# Patient Record
Sex: FEMALE | Race: WHITE | NOT HISPANIC OR LATINO | Employment: PART TIME | URBAN - METROPOLITAN AREA
[De-identification: names, ages, dates, MRNs, and addresses within clinical notes are randomized per-mention and may not be internally consistent; named-entity substitution may affect disease eponyms.]

---

## 2023-10-16 PROBLEM — J06.9 VIRAL UPPER RESPIRATORY TRACT INFECTION: Status: ACTIVE | Noted: 2023-10-16

## 2023-10-18 ENCOUNTER — OFFICE VISIT (OUTPATIENT)
Dept: ORTHOPEDIC SURGERY | Facility: HOSPITAL | Age: 58
End: 2023-10-18
Payer: COMMERCIAL

## 2023-10-18 VITALS — BODY MASS INDEX: 21.66 KG/M2 | HEIGHT: 67 IN | WEIGHT: 138 LBS

## 2023-10-18 DIAGNOSIS — M25.552 LEFT HIP PAIN: Primary | ICD-10-CM

## 2023-10-18 PROCEDURE — 2500000004 HC RX 250 GENERAL PHARMACY W/ HCPCS (ALT 636 FOR OP/ED): Performed by: PHYSICIAN ASSISTANT

## 2023-10-18 PROCEDURE — 1036F TOBACCO NON-USER: CPT | Performed by: PHYSICIAN ASSISTANT

## 2023-10-18 PROCEDURE — 20611 DRAIN/INJ JOINT/BURSA W/US: CPT | Mod: LT | Performed by: PHYSICIAN ASSISTANT

## 2023-10-18 PROCEDURE — 2500000005 HC RX 250 GENERAL PHARMACY W/O HCPCS: Performed by: PHYSICIAN ASSISTANT

## 2023-10-18 RX ORDER — ESTRADIOL 0.03 MG/D
1 PATCH TRANSDERMAL
COMMUNITY
Start: 2018-11-12

## 2023-10-18 RX ORDER — PROGESTERONE 100 MG/1
2 CAPSULE ORAL NIGHTLY
COMMUNITY
Start: 2022-10-11

## 2023-10-18 ASSESSMENT — PAIN DESCRIPTION - DESCRIPTORS: DESCRIPTORS: RADIATING;ACHING;SHARP

## 2023-10-18 ASSESSMENT — PAIN SCALES - GENERAL: PAINLEVEL_OUTOF10: 3

## 2023-10-18 ASSESSMENT — PAIN - FUNCTIONAL ASSESSMENT: PAIN_FUNCTIONAL_ASSESSMENT: 0-10

## 2023-10-19 PROCEDURE — 2500000005 HC RX 250 GENERAL PHARMACY W/O HCPCS: Performed by: PHYSICIAN ASSISTANT

## 2023-10-19 PROCEDURE — 20611 DRAIN/INJ JOINT/BURSA W/US: CPT | Mod: LT | Performed by: PHYSICIAN ASSISTANT

## 2023-10-19 PROCEDURE — 2500000004 HC RX 250 GENERAL PHARMACY W/ HCPCS (ALT 636 FOR OP/ED): Performed by: PHYSICIAN ASSISTANT

## 2023-10-19 RX ORDER — LIDOCAINE HYDROCHLORIDE 10 MG/ML
4 INJECTION, SOLUTION EPIDURAL; INFILTRATION; INTRACAUDAL; PERINEURAL
Status: COMPLETED | OUTPATIENT
Start: 2023-10-19 | End: 2023-10-19

## 2023-10-19 RX ORDER — METHYLPREDNISOLONE ACETATE 40 MG/ML
40 INJECTION, SUSPENSION INTRA-ARTICULAR; INTRALESIONAL; INTRAMUSCULAR; SOFT TISSUE
Status: COMPLETED | OUTPATIENT
Start: 2023-10-19 | End: 2023-10-19

## 2023-10-19 RX ADMIN — LIDOCAINE HYDROCHLORIDE 4 ML: 10 INJECTION, SOLUTION EPIDURAL; INFILTRATION; INTRACAUDAL; PERINEURAL at 11:05

## 2023-10-19 RX ADMIN — METHYLPREDNISOLONE ACETATE 40 MG: 40 INJECTION, SUSPENSION INTRA-ARTICULAR; INTRALESIONAL; INTRAMUSCULAR; INTRASYNOVIAL; SOFT TISSUE at 11:05

## 2023-10-19 NOTE — PROGRESS NOTES
Patient is here today regarding her left hip.  She recently saw Dr. Gillespie.  She has some early degenerative changes in the hip and he had suggested an intra-articular injection for therapeutic purposes.  She is interested in this option and we proceeded as below.      Mariel Wilson PA-C      Patient ID: Bethany Dominguez is a 58 y.o. female.    L Inj/Asp: L hip joint on 10/19/2023 11:05 AM  Indications: pain  Details: 20 G needle, ultrasound-guided anterior approach  Medications: 40 mg methylPREDNISolone acetate 40 mg/mL; 4 mL lidocaine PF 10 mg/mL (1 %)    After consent was obtained, the anterior left hip was prepped in a sterile fashion. Ultrasound guidance was used to help insure proper needle placement into the hip joint, decrease patient discomfort, and decrease collateral damage. The joint was aspirated and Depo 40 mg with lidocaine 4cc were injected without any complications. Ultrasound images were saved on an internal file for later referene. The patient tolerated the procedure well and the area was cleaned and bandaged.    Procedure, treatment alternatives, risks and benefits explained, specific risks discussed. Consent was given by the patient. Immediately prior to procedure a time out was called to verify the correct patient, procedure, equipment, support staff and site/side marked as required. Patient was prepped and draped in the usual sterile fashion.

## 2024-10-11 ENCOUNTER — APPOINTMENT (OUTPATIENT)
Dept: ORTHOPEDIC SURGERY | Facility: HOSPITAL | Age: 59
End: 2024-10-11
Payer: COMMERCIAL

## 2024-10-15 ENCOUNTER — APPOINTMENT (OUTPATIENT)
Dept: ORTHOPEDIC SURGERY | Facility: HOSPITAL | Age: 59
End: 2024-10-15
Payer: COMMERCIAL

## 2024-10-18 ENCOUNTER — OFFICE VISIT (OUTPATIENT)
Dept: ORTHOPEDIC SURGERY | Facility: HOSPITAL | Age: 59
End: 2024-10-18
Payer: COMMERCIAL

## 2024-10-18 ENCOUNTER — HOSPITAL ENCOUNTER (OUTPATIENT)
Dept: RADIOLOGY | Facility: EXTERNAL LOCATION | Age: 59
Discharge: HOME | End: 2024-10-18

## 2024-10-18 DIAGNOSIS — M25.859 FEMORAL ACETABULAR IMPINGEMENT: ICD-10-CM

## 2024-10-18 DIAGNOSIS — M25.552 LEFT HIP PAIN: ICD-10-CM

## 2024-10-18 PROCEDURE — 20611 DRAIN/INJ JOINT/BURSA W/US: CPT | Mod: LT | Performed by: STUDENT IN AN ORGANIZED HEALTH CARE EDUCATION/TRAINING PROGRAM

## 2024-10-18 PROCEDURE — 99214 OFFICE O/P EST MOD 30 MIN: CPT | Performed by: STUDENT IN AN ORGANIZED HEALTH CARE EDUCATION/TRAINING PROGRAM

## 2024-10-18 PROCEDURE — 2500000004 HC RX 250 GENERAL PHARMACY W/ HCPCS (ALT 636 FOR OP/ED): Performed by: STUDENT IN AN ORGANIZED HEALTH CARE EDUCATION/TRAINING PROGRAM

## 2024-10-18 NOTE — PROGRESS NOTES
Sports Medicine Office Note    Today's Date:  10/18/2024     HPI: Bethany Dominguez is a 59 y.o. female who presents today for evaluation of chronic left hip pain.  She states her pain started roughly 12 years ago without any associated injury/trauma.  At that time, she states she had intra-articular hip cortisone injection and went to physical therapy which gave her significant relief for several years.  States her pain became worse roughly 1 year ago without any new injury/trauma and she was seen by Dr. Gillespie on 9/20/2023 and had MRI left hip which revealed evidence of hip dysplasia and mild chondromalacia.  Surgical intervention was not recommended at that time and she underwent cortisone injection of left hip joint with Mariel Wilson PA-C, on 10/18/2023, then was sent to physical therapy.  She states she had significant improvement following this injection and with PT, then her pain started to return over the last few weeks without any new injury.  States pain is primarily in left groin and worse with prolonged durations of walking/standing, hip range of motion.  Denies any radiation of pain, numbness, tingling, weakness, swelling, redness, or warmth.  She has tried OTC anti-inflammatories/analgesics and activity modifications with minimal improvement.  She would like to try repeat cortisone injection of left hip joint if appropriate.  She is open to going back to physical therapy as well.    She has no other complaints.    Physical Examination:     The Left hip and pelvis are without obvious signs of acute bony deformity or instability. Active and passive range of motion are slightly limited in internal rotation and painful. Log roll is slightly positive. Straight leg raise test is negative.  Left groin pain elicited with SIRISHA/FADIR.  Stinchfield positive.  Crossover is negative. Hip strength is weak as compared to the opposite hip. The opposite hip is otherwise nontender and stable. Gait is slightly antalgic  and tandem.     Imaging:  Radiographs of the left hip obtained on 9/20/2023 were reviewed and revealed cam morphology with evidence of hip dysplasia and mild DJD, otherwise no acute osseous abnormalities.  The studies were reviewed by me personally in the office today.    === 09/20/23 ===    HIP W PELVIS    - Impression -  Normal radiographs of the left hip    Problem List Items Addressed This Visit    None  Visit Diagnoses         Codes    Left hip pain     M25.552    Relevant Orders    Point of Care Ultrasound (Completed)    L Inj/Asp: L hip joint    Referral to Physical Therapy    Femoral acetabular impingement     M25.859    Relevant Orders    L Inj/Asp: L hip joint    Referral to Physical Therapy          Procedure Note:  After consent was obtained, the left hip was prepped in a sterile fashion. Ultrasound guidance was used to help insure proper needle placement into the  joint , decrease patient discomfort, and decrease collateral damage. The joint was visualized and Depo-Medrol 80 mg with lidocaine 1% 2 mL & ropivacaine 0.5% 2 mL were injected without any complications. Ultrasound images were saved on an internal file for later reference. The patient tolerated the procedure well and the area was cleaned and bandaged.  Patient ID: Bethany Dominguez is a 59 y.o. female.    L Inj/Asp: L hip joint on 10/18/2024 2:23 PM  Indications: pain  Details: 22 G needle, ultrasound-guided anterior approach  Medications: 2 mL lidocaine 10 mg/mL (1 %); 80 mg methylPREDNISolone acetate 40 mg/mL; 2 mL ropivacaine 5 mg/mL (0.5 %)  Outcome: tolerated well, no immediate complications  Procedure, treatment alternatives, risks and benefits explained, specific risks discussed. Consent was given by the patient. Immediately prior to procedure a time out was called to verify the correct patient, procedure, equipment, support staff and site/side marked as required. Patient was prepped and draped in the usual sterile fashion.          Assessment and Plan:    We reviewed the exam and x-ray findings and discussed the conservative and surgical treatment options. We agreed to a diagnostic and hopefully therapeutic cortisone injection into the left hip joint.   She tolerated this well. Activity modifications were reviewed.  She will keep any scheduled follow-up with Dr. Gillespie.  Physical therapy referral provided and discussed the importance of regular home exercises.  Recommended prescription doses of Tylenol up to 3 times daily as needed.  Otherwise, may try OTC anti-inflammatory such as ibuprofen 600 mg up to 3 times daily as needed for pain.  Discussed with patient that we may consider repeating cortisone injection after 3 months or later if today's injection provides adequate relief.  I will see  her back as needed if/when her pain returns or any new concerns arise.  Discussed this plan with patient who is understanding and agreeable.    **This note was dictated using Dragon speech recognition software and was not corrected for spelling or grammatical errors**.    Wilbert Arce DO  Primary Care Sports Medicine  [facility] [place]

## 2024-10-24 RX ORDER — LIDOCAINE HYDROCHLORIDE 10 MG/ML
2 INJECTION, SOLUTION INFILTRATION; PERINEURAL
Status: COMPLETED | OUTPATIENT
Start: 2024-10-18 | End: 2024-10-18

## 2024-10-24 RX ORDER — ROPIVACAINE HYDROCHLORIDE 5 MG/ML
2 INJECTION, SOLUTION EPIDURAL; INFILTRATION; PERINEURAL
Status: COMPLETED | OUTPATIENT
Start: 2024-10-18 | End: 2024-10-18

## 2024-10-24 RX ORDER — METHYLPREDNISOLONE ACETATE 40 MG/ML
80 INJECTION, SUSPENSION INTRA-ARTICULAR; INTRALESIONAL; INTRAMUSCULAR; SOFT TISSUE
Status: COMPLETED | OUTPATIENT
Start: 2024-10-18 | End: 2024-10-18

## 2025-04-09 ENCOUNTER — TELEPHONE (OUTPATIENT)
Dept: ORTHOPEDIC SURGERY | Facility: HOSPITAL | Age: 60
End: 2025-04-09
Payer: COMMERCIAL

## 2025-04-09 NOTE — TELEPHONE ENCOUNTER
Copied from CRM #[id number]. Topic: Information Request - Doctor, Hospital, or Provider  >> Apr 9, 2025  4:42 PM Fercho ARTEAGA wrote:  Bethany would like to schedule another appointment for an injection with with Dr. Wilbert Arce

## 2025-05-02 ENCOUNTER — OFFICE VISIT (OUTPATIENT)
Dept: ORTHOPEDIC SURGERY | Facility: HOSPITAL | Age: 60
End: 2025-05-02
Payer: COMMERCIAL

## 2025-05-02 ENCOUNTER — HOSPITAL ENCOUNTER (OUTPATIENT)
Dept: RADIOLOGY | Facility: EXTERNAL LOCATION | Age: 60
Discharge: HOME | End: 2025-05-02

## 2025-05-02 DIAGNOSIS — M25.552 LEFT HIP PAIN: ICD-10-CM

## 2025-05-02 DIAGNOSIS — M25.852 FEMOROACETABULAR IMPINGEMENT OF LEFT HIP: Primary | ICD-10-CM

## 2025-05-02 PROCEDURE — 20611 DRAIN/INJ JOINT/BURSA W/US: CPT | Mod: LT | Performed by: STUDENT IN AN ORGANIZED HEALTH CARE EDUCATION/TRAINING PROGRAM

## 2025-05-02 PROCEDURE — 2500000004 HC RX 250 GENERAL PHARMACY W/ HCPCS (ALT 636 FOR OP/ED): Performed by: STUDENT IN AN ORGANIZED HEALTH CARE EDUCATION/TRAINING PROGRAM

## 2025-05-02 PROCEDURE — 99214 OFFICE O/P EST MOD 30 MIN: CPT | Performed by: STUDENT IN AN ORGANIZED HEALTH CARE EDUCATION/TRAINING PROGRAM

## 2025-05-02 PROCEDURE — 99214 OFFICE O/P EST MOD 30 MIN: CPT | Mod: 25 | Performed by: STUDENT IN AN ORGANIZED HEALTH CARE EDUCATION/TRAINING PROGRAM

## 2025-05-02 RX ADMIN — LIDOCAINE HYDROCHLORIDE 2 ML: 10 INJECTION, SOLUTION INFILTRATION; PERINEURAL at 14:20

## 2025-05-02 RX ADMIN — ROPIVACAINE HYDROCHLORIDE 2 ML: 5 INJECTION EPIDURAL; INFILTRATION; PERINEURAL at 14:20

## 2025-05-02 RX ADMIN — METHYLPREDNISOLONE ACETATE 80 MG: 40 INJECTION, SUSPENSION INTRA-ARTICULAR; INTRALESIONAL; INTRAMUSCULAR; INTRASYNOVIAL; SOFT TISSUE at 14:20

## 2025-05-02 NOTE — PROGRESS NOTES
Sports Medicine Office Note    Today's Date:  05/02/2025     HPI: Bethany Dominguez is a 59 y.o. *** who presents today for ***    ***    *** has no other complaints.    Physical Examination:     ***    Imaging:  Radiographs of the *** were reviewed and revealed ***.    The studies were reviewed by me personally in the office today.    === 09/20/23 ===    HIP W PELVIS    - Impression -  Normal radiographs of the left hip    Problem List Items Addressed This Visit    None  Visit Diagnoses         Codes      Left hip pain     M25.552    Relevant Orders    Point of Care Ultrasound (Completed)          Procedure Note:  After consent was obtained, the left hip was prepped in a sterile fashion. Ultrasound guidance was used to help insure proper needle placement into the  joint , decrease patient discomfort, and decrease collateral damage. The joint was visualized and Depo-Medrol 80 mg with lidocaine 1% 2 mL & ropivacaine 0.5% 2 mL were injected without any complications. Ultrasound images were saved on an internal file for later reference. The patient tolerated the procedure well and the area was cleaned and bandaged.  Patient ID: Bethany Dominguez is a 59 y.o. female.    L Inj/Asp: L hip joint on 5/2/2025 2:20 PM  Indications: pain  Details: 22 G needle, ultrasound-guided anterior approach  Medications: 2 mL lidocaine 10 mg/mL (1 %); 80 mg methylPREDNISolone acetate 40 mg/mL; 2 mL ropivacaine 5 mg/mL (0.5 %)  Outcome: tolerated well, no immediate complications  Procedure, treatment alternatives, risks and benefits explained, specific risks discussed. Consent was given by the patient. Immediately prior to procedure a time out was called to verify the correct patient, procedure, equipment, support staff and site/side marked as required. Patient was prepped and draped in the usual sterile fashion.         Assessment and Plan:    We reviewed the exam and imaging findings and discussed the conservative and surgical treatment  "options. We agreed to a diagnostic and hopefully therapeutic cortisone injection into the ***.  {Blank single:61249::\" He\",\" She\"} tolerated this well. Activity modifications were reviewed. {Blank single:13476::\" He\",\" She\"} will keep any scheduled follow-up with ***. Physical therapy referral *** provided and discussed the importance of regular home exercises.  Recommended prescription doses of Tylenol and encouraged use of ice or heat as needed for acute flares of pain.  Plan for follow-up in *** for re-evaluation, otherwise may follow-up sooner if any new concerns arise.  Discussed this plan with the patient who is understanding and agreeable.    **This note was dictated using Dragon speech recognition software and was not corrected for spelling or grammatical errors**.    Wilbert Arce, DO  Primary Care Sports Medicine  [facility] [place]   " ID: Bethany Dominguez is a 59 y.o. female.    L Inj/Asp: L hip joint on 5/2/2025 2:20 PM  Indications: pain  Details: 22 G needle, ultrasound-guided anterior approach  Medications: 2 mL lidocaine 10 mg/mL (1 %); 80 mg methylPREDNISolone acetate 40 mg/mL; 2 mL ropivacaine 5 mg/mL (0.5 %)  Outcome: tolerated well, no immediate complications  Procedure, treatment alternatives, risks and benefits explained, specific risks discussed. Consent was given by the patient. Immediately prior to procedure a time out was called to verify the correct patient, procedure, equipment, support staff and site/side marked as required. Patient was prepped and draped in the usual sterile fashion.         Assessment and Plan:    We reviewed the exam and imaging findings and discussed the conservative and surgical treatment options. We agreed to a repeat hopefully therapeutic cortisone injection into the left hip joint.   She tolerated this well. Activity modifications were reviewed. Discussed the importance of regular home exercises.  Recommended prescription doses of Tylenol and encouraged use of ice or heat as needed for acute flares of pain.  Plan for follow-up in 6 weeks for re-evaluation, otherwise may follow-up sooner if any new concerns arise.  Discussed this plan with the patient who is understanding and agreeable.    **This note was dictated using Dragon speech recognition software and was not corrected for spelling or grammatical errors**.    Wilbert Arce DO  Primary Care Sports Medicine  [facility] [place]

## 2025-05-18 RX ORDER — METHYLPREDNISOLONE ACETATE 40 MG/ML
80 INJECTION, SUSPENSION INTRA-ARTICULAR; INTRALESIONAL; INTRAMUSCULAR; SOFT TISSUE
Status: COMPLETED | OUTPATIENT
Start: 2025-05-02 | End: 2025-05-02

## 2025-05-18 RX ORDER — LIDOCAINE HYDROCHLORIDE 10 MG/ML
2 INJECTION, SOLUTION INFILTRATION; PERINEURAL
Status: COMPLETED | OUTPATIENT
Start: 2025-05-02 | End: 2025-05-02

## 2025-05-18 RX ORDER — ROPIVACAINE HYDROCHLORIDE 5 MG/ML
2 INJECTION, SOLUTION EPIDURAL; INFILTRATION; PERINEURAL
Status: COMPLETED | OUTPATIENT
Start: 2025-05-02 | End: 2025-05-02

## 2025-06-10 ENCOUNTER — APPOINTMENT (OUTPATIENT)
Dept: ORTHOPEDIC SURGERY | Facility: HOSPITAL | Age: 60
End: 2025-06-10
Payer: COMMERCIAL

## 2025-07-29 ENCOUNTER — HOSPITAL ENCOUNTER (OUTPATIENT)
Dept: RADIOLOGY | Facility: HOSPITAL | Age: 60
Discharge: HOME | End: 2025-07-29
Payer: COMMERCIAL

## 2025-07-29 ENCOUNTER — HOSPITAL ENCOUNTER (OUTPATIENT)
Dept: RADIOLOGY | Facility: EXTERNAL LOCATION | Age: 60
Discharge: HOME | End: 2025-07-29

## 2025-07-29 ENCOUNTER — OFFICE VISIT (OUTPATIENT)
Dept: ORTHOPEDIC SURGERY | Facility: HOSPITAL | Age: 60
End: 2025-07-29
Payer: COMMERCIAL

## 2025-07-29 DIAGNOSIS — M25.552 LEFT HIP PAIN: ICD-10-CM

## 2025-07-29 DIAGNOSIS — M25.852 FEMOROACETABULAR IMPINGEMENT OF LEFT HIP: Primary | ICD-10-CM

## 2025-07-29 DIAGNOSIS — M67.952 TENDINOPATHY OF LEFT GLUTEUS MEDIUS: ICD-10-CM

## 2025-07-29 PROCEDURE — 99212 OFFICE O/P EST SF 10 MIN: CPT | Mod: 25 | Performed by: STUDENT IN AN ORGANIZED HEALTH CARE EDUCATION/TRAINING PROGRAM

## 2025-07-29 PROCEDURE — 73502 X-RAY EXAM HIP UNI 2-3 VIEWS: CPT | Mod: LT

## 2025-07-29 PROCEDURE — 2500000004 HC RX 250 GENERAL PHARMACY W/ HCPCS (ALT 636 FOR OP/ED): Performed by: STUDENT IN AN ORGANIZED HEALTH CARE EDUCATION/TRAINING PROGRAM

## 2025-07-29 PROCEDURE — 73502 X-RAY EXAM HIP UNI 2-3 VIEWS: CPT | Mod: LEFT SIDE | Performed by: RADIOLOGY

## 2025-07-29 PROCEDURE — 99214 OFFICE O/P EST MOD 30 MIN: CPT | Performed by: STUDENT IN AN ORGANIZED HEALTH CARE EDUCATION/TRAINING PROGRAM

## 2025-07-29 PROCEDURE — 20611 DRAIN/INJ JOINT/BURSA W/US: CPT | Mod: LT | Performed by: STUDENT IN AN ORGANIZED HEALTH CARE EDUCATION/TRAINING PROGRAM

## 2025-07-29 RX ADMIN — LIDOCAINE HYDROCHLORIDE 2 ML: 10 INJECTION, SOLUTION INFILTRATION; PERINEURAL at 13:01

## 2025-07-29 RX ADMIN — TRIAMCINOLONE ACETONIDE 80 MG: 400 INJECTION, SUSPENSION INTRA-ARTICULAR; INTRAMUSCULAR at 13:01

## 2025-07-29 RX ADMIN — ROPIVACAINE HYDROCHLORIDE 2 ML: 5 INJECTION EPIDURAL; INFILTRATION; PERINEURAL at 13:01

## 2025-07-29 ASSESSMENT — PAIN - FUNCTIONAL ASSESSMENT: PAIN_FUNCTIONAL_ASSESSMENT: 0-10

## 2025-07-29 ASSESSMENT — PAIN SCALES - GENERAL: PAINLEVEL_OUTOF10: 7

## 2025-07-29 ASSESSMENT — PAIN DESCRIPTION - DESCRIPTORS: DESCRIPTORS: ACHING;SHOOTING

## 2025-08-01 RX ORDER — LIDOCAINE HYDROCHLORIDE 10 MG/ML
2 INJECTION, SOLUTION INFILTRATION; PERINEURAL
Status: COMPLETED | OUTPATIENT
Start: 2025-07-29 | End: 2025-07-29

## 2025-08-01 RX ORDER — ROPIVACAINE HYDROCHLORIDE 5 MG/ML
2 INJECTION, SOLUTION EPIDURAL; INFILTRATION; PERINEURAL
Status: COMPLETED | OUTPATIENT
Start: 2025-07-29 | End: 2025-07-29

## 2025-08-01 RX ORDER — TRIAMCINOLONE ACETONIDE 40 MG/ML
80 INJECTION, SUSPENSION INTRA-ARTICULAR; INTRAMUSCULAR
Status: COMPLETED | OUTPATIENT
Start: 2025-07-29 | End: 2025-07-29

## 2025-08-01 NOTE — PROGRESS NOTES
Sports Medicine Office Note    Today's Date:  07/29/2025     HPI: Bethany Dominguez is a 59 y.o. female who presents today for follow-up of chronic left hip pain.       10/18/2024: She states her pain started roughly 12 years ago without any associated injury/trauma.  At that time, she states she had intra-articular hip cortisone injection and went to physical therapy which gave her significant relief for several years.  States her pain became worse roughly 1 year ago without any new injury/trauma and she was seen by Dr. Gillespie on 9/20/2023 and had MRI left hip which revealed evidence of hip dysplasia and mild chondromalacia.  Surgical intervention was not recommended at that time and she underwent cortisone injection of left hip joint with Mariel Wilson PA-C, on 10/18/2023, then was sent to physical therapy.  She states she had significant improvement following this injection and with PT, then her pain started to return over the last few weeks without any new injury.  States pain is primarily in left groin and worse with prolonged durations of walking/standing, hip range of motion.  Denies any radiation of pain, numbness, tingling, weakness, swelling, redness, or warmth.  She has tried OTC anti-inflammatories/analgesics and activity modifications with minimal improvement.  She would like to try repeat cortisone injection of left hip joint if appropriate.  She is open to going back to physical therapy as well.     5/2/2025: She presents today for follow-up of chronic left hip pain s/p left hip joint cortisone injection performed on 10/18/2024.  She states she got significant relief from this injection that lasted up until the last few weeks, stating her pain has started to return with progressive worsening.  She denies any interval injury/trauma or any new swelling, redness, warmth, bruising, numbness, tingling, weakness.  States pain is primarily in left groin with slight radiation into anterior thigh.  Pain is  worse with prolonged durations of walking/standing and full hip range of motion.  She has previously followed with PT and follows home exercises regularly which do seem to help with overall stability and strength.  States she is interested in repeating cortisone injection today if appropriate.    7/29/2025: She presents today for follow-up of chronic left hip pain s/p left hip joint cortisone injection performed on 5/2/2025.  States she got some relief of her left groin pain following this injection, but has had persistent posterior/lateral pain of the left hip.  States pain is worse with walking/standing for longer durations, going from seated to standing position.  Denies interval injury/trauma or any new swelling, redness, warmth, bruising, numbness, tingling, or weakness.     She has no other complaints.     Physical Examination:      The Left hip and pelvis are without obvious signs of acute bony deformity or instability. Active and passive range of motion are slightly limited in internal rotation and painful. Log roll is slightly positive. Straight leg raise test is negative.  Mild left groin and posterior/lateral hip pain elicited with SIRISHA/FADIR.  Stinchfield positive.  Crossover is negative.  Trendelenburg positive.  There is tenderness to palpation over greater trochanter and gluteal musculature/tendons.  Hip strength is slightly weak as compared to the opposite hip. The opposite hip is otherwise nontender and stable. Gait is slightly antalgic and tandem.      Imaging:  Radiographs of the left hip obtained on 9/20/2023 were reviewed and revealed cam morphology with evidence of hip dysplasia and mild DJD, otherwise no acute osseous abnormalities.     The studies were reviewed by me personally in the office today.    Problem List Items Addressed This Visit    None  Visit Diagnoses         Codes      Femoroacetabular impingement of left hip    -  Primary M25.852      Left hip pain     M25.552    Relevant  Orders    XR hip left with pelvis when performed 2 or 3 views (Completed)    Point of Care Ultrasound (Completed)      Tendinopathy of left gluteus medius     M67.952          Procedure Note:  After consent was obtained, the left was prepped in a sterile fashion. Ultrasound guidance was used to help insure proper needle placement into the left lateral hip/gluteus medius tendon sheath, decrease patient discomfort, and decrease collateral damage. The tendon sheath was visualized and Kenalog 80 mg with lidocaine 1% 2 mL & ropivacaine 0.5% 2 mL were injected without any complications. Ultrasound images were saved on an internal file for later reference. The patient tolerated the procedure well and the area was cleaned and bandaged.  Patient ID: Bethany Dominguez is a 59 y.o. female.    L Inj/Asp: L greater trochanteric bursa on 7/29/2025 1:01 PM  Indications: pain and diagnostic evaluation  Details: 22 G needle, ultrasound-guided lateral approach  Medications: 2 mL lidocaine 10 mg/mL (1 %); 80 mg triamcinolone acetonide 40 mg/mL; 2 mL ropivacaine 5 mg/mL (0.5 %)  Outcome: tolerated well, no immediate complications  Procedure, treatment alternatives, risks and benefits explained, specific risks discussed. Consent was given by the patient. Immediately prior to procedure a time out was called to verify the correct patient, procedure, equipment, support staff and site/side marked as required. Patient was prepped and draped in the usual sterile fashion.         Assessment and Plan:    We reviewed the exam and x-ray findings and discussed the conservative and surgical treatment options. We agreed to a diagnostic and hopefully therapeutic cortisone injection into the left lateral hip/gluteus medius tendon sheath.   She tolerated this well. Activity modifications were reviewed.  Discussed the importance of regular home exercise program as tolerated.  Recommended prescription doses of Tylenol and Voltaren gel to be applied over  the lateral hip. Encouraged use of ice or heat as needed for acute flares of pain.  Discussed with patient that if she does get significant relief following today's injection, may consider percutaneous needle tenotomy of left gluteus medius tendon.  Will discuss again at follow-up visit.  Plan for follow-up in 1 month for re-evaluation, otherwise may follow-up sooner if any new concerns arise.  Discussed this plan with the patient who is understanding and agreeable.    **This note was dictated using Dragon speech recognition software and was not corrected for spelling or grammatical errors**.    Wilbert Arce,   Primary Care Sports Medicine  [facility] [place]

## 2025-08-26 ENCOUNTER — APPOINTMENT (OUTPATIENT)
Dept: ORTHOPEDIC SURGERY | Facility: HOSPITAL | Age: 60
End: 2025-08-26
Payer: COMMERCIAL